# Patient Record
Sex: MALE | Race: WHITE | NOT HISPANIC OR LATINO | Employment: FULL TIME | URBAN - METROPOLITAN AREA
[De-identification: names, ages, dates, MRNs, and addresses within clinical notes are randomized per-mention and may not be internally consistent; named-entity substitution may affect disease eponyms.]

---

## 2019-01-10 ENCOUNTER — OFFICE VISIT (OUTPATIENT)
Dept: CARDIOLOGY CLINIC | Facility: CLINIC | Age: 54
End: 2019-01-10
Payer: COMMERCIAL

## 2019-01-10 VITALS
DIASTOLIC BLOOD PRESSURE: 70 MMHG | SYSTOLIC BLOOD PRESSURE: 120 MMHG | WEIGHT: 241 LBS | HEIGHT: 74 IN | BODY MASS INDEX: 30.93 KG/M2 | HEART RATE: 70 BPM

## 2019-01-10 DIAGNOSIS — I10 HYPERTENSION, UNSPECIFIED TYPE: Primary | ICD-10-CM

## 2019-01-10 PROCEDURE — 99243 OFF/OP CNSLTJ NEW/EST LOW 30: CPT | Performed by: INTERNAL MEDICINE

## 2019-01-10 PROCEDURE — 93000 ELECTROCARDIOGRAM COMPLETE: CPT | Performed by: INTERNAL MEDICINE

## 2019-01-10 RX ORDER — LISINOPRIL 10 MG/1
10 TABLET ORAL DAILY
COMMUNITY

## 2019-01-10 NOTE — PROGRESS NOTES
[address] [facility] [place]  [address]  [facility], [address] [facility]  [phone]    Cardiology New Patient     Patient:  Rebecca Sheffield  :  1965  MRN:  [id number]    History of Present Illness:     26-year-old man with medical history of hypertension and family history of cardiovascular disease with his mom having a stent in her late 76s presents for new patient cardiology evaluation  He is completely asymptomatic from a cardiovascular standpoint denying complaints of chest pain, shortness of breath, palpitations, dizziness, or syncope  He does note that his heart skips a beat when he feels his pulse from time to time  He is currently in a relationship  He has 2 adult daughters  He works as a   He does not smoke or use drugs  He occasionally has a [address] iced tea  There is no problem list on file for this patient  Past Surgical History  No past surgical history on file  Social History   Social History     Social History    Marital status: Unknown     Spouse name: N/A    Number of children: N/A    Years of education: N/A     Occupational History    Not on file  Social History Main Topics    Smoking status: Not on file    Smokeless tobacco: Not on file    Alcohol use Not on file    Drug use: Unknown    Sexual activity: Not on file     Other Topics Concern    Not on file     Social History Narrative    No narrative on file        Allergies not on file    Family History   No family history on file  Review of Systems:  Review of Systems   Constitutional: Negative for chills, fatigue and fever  HENT: Negative for hearing loss and trouble swallowing  Eyes: Negative for pain  Respiratory: Negative for cough, chest tightness and shortness of breath  Cardiovascular: Negative for chest pain, palpitations and leg swelling  Gastrointestinal: Negative for abdominal pain, blood in stool, nausea and vomiting     Endocrine: Negative for cold intolerance and heat intolerance  Genitourinary: Negative for difficulty urinating, frequency and hematuria  Musculoskeletal: Negative for arthralgias and neck pain  Skin: Negative for rash  Allergic/Immunologic: Negative for environmental allergies  Neurological: Negative for dizziness, weakness and headaches  Hematological: Does not bruise/bleed easily  Psychiatric/Behavioral: Negative for decreased concentration and sleep disturbance  The patient is not nervous/anxious  No current outpatient prescriptions on file  Physical Exam:    There were no vitals filed for this visit  Physical Exam   Constitutional: He is oriented to person, place, and time  He appears well-developed and well-nourished  HENT:   Head: Normocephalic  Right Ear: External ear normal    Left Ear: External ear normal    Mouth/Throat: Oropharynx is clear and moist    Eyes: Pupils are equal, round, and reactive to light  Neck: No JVD present  Carotid bruit is not present  Cardiovascular: Normal rate, regular rhythm and intact distal pulses  Exam reveals no gallop and no friction rub  No murmur heard  Pulmonary/Chest: Effort normal and breath sounds normal  No tachypnea  No respiratory distress  He has no wheezes  He has no rales  He exhibits no tenderness  Abdominal: Soft  He exhibits no distension  There is no tenderness  There is no rebound and no guarding  Musculoskeletal: He exhibits no edema  Neurological: He is alert and oriented to person, place, and time  Skin: Skin is warm and dry  Psychiatric: He has a normal mood and affect  His behavior is normal  Judgment and thought content normal    Nursing note and vitals reviewed  Labs:not applicable    Assessment/Plan:    1  Premature ventricular complexes-these are relatively infrequent  We did speak about relaxation and healthy lifestyle including nutrition and diet  2   Hypertension-this is controlled      3   Reported elevated hemoglobin A1c-he tells me this is quite elevated  If he is in the diabetic range I would consider statin for him  If he is not in the diabetic range I would consider coronary calcium score in the next year or 2 given his family history of cardiovascular disease  I did recommend he see us in the future for preventive Cardiology  Thank you so much, please do not hesitate to contact me if there any questions or concerns        Chris Davis MD  1/10/2019  8:49 AM

## 2019-01-10 NOTE — LETTER
January 10, 2019     Gerson AlexesDO  [address] [address]    Patient: Kameron Tao   YOB: 1965   Date of Visit: 1/10/2019       Dear Dr Zuleika Raymundo:    Thank you for referring Kameron Tao to me for evaluation  Below are my notes for this consultation  If you have questions, please do not hesitate to call me  I look forward to following your patient along with you  Sincerely,        Cole Weeks MD        CC: No Recipients  Cole Weeks MD  1/10/2019  9:57 AM  Sign at close encounter  [address] Cardiology [facility], [address] [place] [phone]    Cardiology New Patient     Patient:  Kameron Tao  :  1965  MRN:  [id number]    History of Present Illness:     65-year-old man with medical history of hypertension and family history of cardiovascular disease with his mom having a stent in her late 76s presents for new patient cardiology evaluation  He is completely asymptomatic from a cardiovascular standpoint denying complaints of chest pain, shortness of breath, palpitations, dizziness, or syncope  He does note that his heart skips a beat when he feels his pulse from time to time  He is currently in a relationship  He has 2 adult daughters  He works as a   He does not smoke or use drugs  He occasionally has a [address] iced tea  There is no problem list on file for this patient  Past Surgical History  No past surgical history on file  Social History   Social History     Social History    Marital status: Unknown     Spouse name: N/A    Number of children: N/A    Years of education: N/A     Occupational History    Not on file       Social History Main Topics    Smoking status: Not on file    Smokeless tobacco: Not on file    Alcohol use Not on file    Drug use: Unknown    Sexual activity: Not on file     Other Topics Concern    Not on file     Social History Narrative    No narrative on file        Allergies not on file    Family History   No family history on file  Review of Systems:  Review of Systems   Constitutional: Negative for chills, fatigue and fever  HENT: Negative for hearing loss and trouble swallowing  Eyes: Negative for pain  Respiratory: Negative for cough, chest tightness and shortness of breath  Cardiovascular: Negative for chest pain, palpitations and leg swelling  Gastrointestinal: Negative for abdominal pain, blood in stool, nausea and vomiting  Endocrine: Negative for cold intolerance and heat intolerance  Genitourinary: Negative for difficulty urinating, frequency and hematuria  Musculoskeletal: Negative for arthralgias and neck pain  Skin: Negative for rash  Allergic/Immunologic: Negative for environmental allergies  Neurological: Negative for dizziness, weakness and headaches  Hematological: Does not bruise/bleed easily  Psychiatric/Behavioral: Negative for decreased concentration and sleep disturbance  The patient is not nervous/anxious  No current outpatient prescriptions on file  Physical Exam:    There were no vitals filed for this visit  Physical Exam   Constitutional: He is oriented to person, place, and time  He appears well-developed and well-nourished  HENT:   Head: Normocephalic  Right Ear: External ear normal    Left Ear: External ear normal    Mouth/Throat: Oropharynx is clear and moist    Eyes: Pupils are equal, round, and reactive to light  Neck: No JVD present  Carotid bruit is not present  Cardiovascular: Normal rate, regular rhythm and intact distal pulses  Exam reveals no gallop and no friction rub  No murmur heard  Pulmonary/Chest: Effort normal and breath sounds normal  No tachypnea  No respiratory distress  He has no wheezes  He has no rales  He exhibits no tenderness  Abdominal: Soft  He exhibits no distension  There is no tenderness  There is no rebound and no guarding  Musculoskeletal: He exhibits no edema  Neurological: He is alert and oriented to person, place, and time  Skin: Skin is warm and dry  Psychiatric: He has a normal mood and affect  His behavior is normal  Judgment and thought content normal    Nursing note and vitals reviewed  Labs:not applicable    Assessment/Plan:    1  Premature ventricular complexes-these are relatively infrequent  We did speak about relaxation and healthy lifestyle including nutrition and diet  2   Hypertension-this is controlled  3   Reported elevated hemoglobin A1c-he tells me this is quite elevated  If he is in the diabetic range I would consider statin for him  If he is not in the diabetic range I would consider coronary calcium score in the next year or 2 given his family history of cardiovascular disease  I did recommend he see us in the future for preventive Cardiology  Thank you so much, please do not hesitate to contact me if there any questions or concerns        Marcello Perea MD  1/10/2019  8:49 AM